# Patient Record
Sex: MALE | Race: OTHER | HISPANIC OR LATINO | Employment: FULL TIME | URBAN - METROPOLITAN AREA
[De-identification: names, ages, dates, MRNs, and addresses within clinical notes are randomized per-mention and may not be internally consistent; named-entity substitution may affect disease eponyms.]

---

## 2019-07-29 ENCOUNTER — HOSPITAL ENCOUNTER (EMERGENCY)
Facility: HOSPITAL | Age: 19
Discharge: HOME | End: 2019-07-29
Attending: EMERGENCY MEDICINE

## 2019-07-29 ENCOUNTER — APPOINTMENT (EMERGENCY)
Dept: RADIOLOGY | Facility: HOSPITAL | Age: 19
End: 2019-07-29

## 2019-07-29 VITALS
OXYGEN SATURATION: 98 % | HEART RATE: 117 BPM | DIASTOLIC BLOOD PRESSURE: 79 MMHG | WEIGHT: 145 LBS | TEMPERATURE: 99.5 F | SYSTOLIC BLOOD PRESSURE: 153 MMHG | RESPIRATION RATE: 20 BRPM

## 2019-07-29 DIAGNOSIS — S60.032A CONTUSION OF LEFT MIDDLE FINGER WITHOUT DAMAGE TO NAIL, INITIAL ENCOUNTER: Primary | ICD-10-CM

## 2019-07-29 PROCEDURE — 99282 EMERGENCY DEPT VISIT SF MDM: CPT | Performed by: PHYSICIAN ASSISTANT

## 2019-07-29 PROCEDURE — 99283 EMERGENCY DEPT VISIT LOW MDM: CPT

## 2019-07-29 PROCEDURE — 73140 X-RAY EXAM OF FINGER(S): CPT

## 2019-08-01 NOTE — ED PROVIDER NOTES
History  Chief Complaint   Patient presents with    Finger Injury     per pt "he hit his middle finger on his left on a bed frame about 2 weeks ago and it has been hurting, but it got worse today "      24-year-old male presents to the emergency department with complaints of left-sided finger pain  States that he had the base of his left middle finger and hand on the bed from approximately 2 weeks ago  States initially area was swollen and painful with seem to have some slight improvement  Patient reports that pain seems to be worse with persistent use  But he has increased swelling over the past several days  Patient is right-handed  No previous injury to the left hand  Not currently taking any medications over-the-counter for his symptoms  History provided by:  Patient   used: No        None       History reviewed  No pertinent past medical history  Past Surgical History:   Procedure Laterality Date    WISDOM TOOTH EXTRACTION         Family History   Problem Relation Age of Onset    Depression Family      I have reviewed and agree with the history as documented  Social History     Tobacco Use    Smoking status: Unknown If Ever Smoked    Smokeless tobacco: Never Used   Substance Use Topics    Alcohol use: Yes     Comment: socially    Drug use: Yes     Types: Marijuana        Review of Systems   Constitutional: Negative for chills and fever  Respiratory: Negative for cough  Cardiovascular: Negative for chest pain  Musculoskeletal: Negative for arthralgias and back pain  Hand pain   Skin: Negative for rash and wound  All other systems reviewed and are negative  Physical Exam  Physical Exam   Constitutional: He is oriented to person, place, and time  Vital signs are normal  He appears well-developed and well-nourished  HENT:   Head: Normocephalic and atraumatic  Cardiovascular: Normal rate and regular rhythm     Pulmonary/Chest: Effort normal and breath sounds normal  No respiratory distress  He has no wheezes  He has no rhonchi  He has no rales  Musculoskeletal:        Left hand: He exhibits tenderness  He exhibits normal range of motion, no bony tenderness, normal two-point discrimination, normal capillary refill, no deformity, no laceration and no swelling  Hands:  Neurological: He is alert and oriented to person, place, and time  Skin: Skin is warm and dry  Psychiatric: He has a normal mood and affect  His behavior is normal    Nursing note and vitals reviewed  Vital Signs  ED Triage Vitals [07/29/19 2102]   Temperature Pulse Respirations Blood Pressure SpO2   99 5 °F (37 5 °C) (!) 117 20 153/79 98 %      Temp Source Heart Rate Source Patient Position - Orthostatic VS BP Location FiO2 (%)   Oral Monitor Sitting Left arm --      Pain Score       5           Vitals:    07/29/19 2102   BP: 153/79   Pulse: (!) 117   Patient Position - Orthostatic VS: Sitting         Visual Acuity      ED Medications  Medications - No data to display    Diagnostic Studies  Results Reviewed     None                 XR finger third digit - middle LEFT   ED Interpretation by Marly Ortega PA-C (07/29 2158)   No fracture      Final Result by Lorrie Dean MD (07/30 0815)      No acute osseous abnormality              Workstation performed: [id number]                    Procedures  Procedures       ED Course                               MDM  Number of Diagnoses or Management Options  Contusion of left middle finger without damage to nail, initial encounter:   Diagnosis management comments: Differential diagnosis includes but not limited to:  Hand contusion, hand fracture         Amount and/or Complexity of Data Reviewed  Tests in the radiology section of CPT®: reviewed and ordered  Independent visualization of images, tracings, or specimens: yes        Disposition  Final diagnoses:   Contusion of left middle finger without damage to nail, initial encounter Time reflects when diagnosis was documented in both MDM as applicable and the Disposition within this note     Time User Action Codes Description Comment    7/29/2019 10:03 PM T[facility], [facility] Contusion of left middle finger without damage to nail, initial encounter       ED Disposition     ED Disposition Condition Date/Time Comment    Discharge Stable Mon Jul 29, 2019 10:03 PM Kieth Boas discharge to home/self care  Follow-up Information     Follow up With Specialties Details Why [address], MD Orthopedic Surgery   [address]  [phone]            There are no discharge medications for this patient  No discharge procedures on file      ED Provider  Electronically Signed by           Amrit Kelley PA-C  08/01/19 1018